# Patient Record
Sex: FEMALE | Race: WHITE | NOT HISPANIC OR LATINO | Employment: UNEMPLOYED | URBAN - METROPOLITAN AREA
[De-identification: names, ages, dates, MRNs, and addresses within clinical notes are randomized per-mention and may not be internally consistent; named-entity substitution may affect disease eponyms.]

---

## 2018-03-27 ENCOUNTER — OFFICE VISIT (OUTPATIENT)
Dept: MEDICAL GROUP | Facility: MEDICAL CENTER | Age: 28
End: 2018-03-27
Payer: COMMERCIAL

## 2018-03-27 VITALS
DIASTOLIC BLOOD PRESSURE: 80 MMHG | WEIGHT: 270.6 LBS | TEMPERATURE: 98.7 F | HEIGHT: 66 IN | OXYGEN SATURATION: 100 % | BODY MASS INDEX: 43.49 KG/M2 | SYSTOLIC BLOOD PRESSURE: 120 MMHG | HEART RATE: 91 BPM | RESPIRATION RATE: 16 BRPM

## 2018-03-27 DIAGNOSIS — N92.6 IRREGULAR MENSES: ICD-10-CM

## 2018-03-27 DIAGNOSIS — E66.01 MORBID OBESITY WITH BMI OF 40.0-44.9, ADULT (HCC): ICD-10-CM

## 2018-03-27 DIAGNOSIS — Z76.89 ENCOUNTER TO ESTABLISH CARE: ICD-10-CM

## 2018-03-27 DIAGNOSIS — N64.4 BREAST PAIN, RIGHT: ICD-10-CM

## 2018-03-27 DIAGNOSIS — D25.9 UTERINE LEIOMYOMA, UNSPECIFIED LOCATION: ICD-10-CM

## 2018-03-27 LAB
INT CON NEG: NEGATIVE
INT CON POS: POSITIVE
POC URINE PREGNANCY TEST: NEGATIVE

## 2018-03-27 PROCEDURE — 99204 OFFICE O/P NEW MOD 45 MIN: CPT | Performed by: PHYSICIAN ASSISTANT

## 2018-03-27 PROCEDURE — 81025 URINE PREGNANCY TEST: CPT | Performed by: PHYSICIAN ASSISTANT

## 2018-03-27 ASSESSMENT — PATIENT HEALTH QUESTIONNAIRE - PHQ9
5. POOR APPETITE OR OVEREATING: 0 - NOT AT ALL
CLINICAL INTERPRETATION OF PHQ2 SCORE: 1
SUM OF ALL RESPONSES TO PHQ QUESTIONS 1-9: 8

## 2018-03-27 NOTE — ASSESSMENT & PLAN NOTE
This is a 27-year-old female who is here today to establish care. She has a chronic history of irregular menses. States that she will not have a menses for a month after she is supposed to. This past time it was almost 2 months. She is just finishing her cycle today. In the past also she has had 14 days of vaginal bleeding. She had an ultrasound performed in 2016 that showed fibroids. She was told that they were small. She has one child. He is 7. It took them 3 years to conceive. She would like to consider possibly having kids in the near future. Makes it difficult when her menses are irregular. Denies any pelvic pain.

## 2018-03-27 NOTE — PROGRESS NOTES
"Subjective:   Kaitlynn Gregorio is a 27 y.o. female here today for chronic history of irregular menses and to establish care.    Irregular menses  This is a 27-year-old female who is here today to establish care. She has a chronic history of irregular menses. States that she will not have a menses for a month after she is supposed to. This past time it was almost 2 months. She is just finishing her cycle today. In the past also she has had 14 days of vaginal bleeding. She had an ultrasound performed in 2016 that showed fibroids. She was told that they were small. She has one child. He is 7. It took them 3 years to conceive. She would like to consider possibly having kids in the near future. Makes it difficult when her menses are irregular. Denies any pelvic pain.    Breast pain, right  Also complains of some right-sided breast pain over the past couple days. Initially symptoms were on the left side. Over the past days symptoms have improved. Denies any redness. Denies any significant pain. Denies any trauma. No family history of breast cancer. Mother  at 41 from Occasions with a grand mal seizure.       Current medicines (including changes today)  Current Outpatient Prescriptions   Medication Sig Dispense Refill   • RaNITidine HCl (ZANTAC PO) Take  by mouth.     • Acetaminophen (TYLENOL 8 HOUR PO) Take  by mouth.       No current facility-administered medications for this visit.      She  has no past medical history on file.    Social History and Family History were reviewed and updated.    ROS   No chest pain, no shortness of breath, no abdominal pain and all other systems were reviewed and are negative.       Objective:     Blood pressure 120/80, pulse 91, temperature 37.1 °C (98.7 °F), resp. rate 16, height 1.676 m (5' 6\"), weight 122.7 kg (270 lb 9.6 oz), last menstrual period 2018, SpO2 100 %, not currently breastfeeding. Body mass index is 43.68 kg/m².   Physical Exam:  Constitutional: Alert, no " distress.  Skin: Warm, dry, good turgor, no rashes in visible areas.  Eye: Equal, round and reactive, conjunctiva clear, lids normal.  ENMT: Lips without lesions, good dentition, oropharynx clear.  Neck: Trachea midline, no masses.   Lymph: No cervical or supraclavicular lymphadenopathy  Respiratory: Unlabored respiratory effort, lungs clear to auscultation, no wheezes, no ronchi.  Cardiovascular: Normal S1, S2, no murmur, no edema.  Breast exam was deferred.  Psych: Alert and oriented x3, normal affect and mood.        Assessment and Plan:   The following treatment plan was discussed    1. Irregular menses  Chronic condition. Referred to gynecology to establish care. She is not pregnant today. Discussed possible birth control but she is trying to get pregnant.  - POCT Pregnancy  - REFERRAL TO GYNECOLOGY    2. Uterine leiomyoma, unspecified location  Chronic condition. Likely not a cause of her regular menses. Referred to gynecology. Offered imaging in case she can't see gynecology for several months.  - POCT Pregnancy  - REFERRAL TO GYNECOLOGY    3. Breast pain, right  Acute, new onset condition. Resolving. Advised to contact me with any further concerns. Would order diagnostic and ultrasound.    4. Morbid obesity with BMI of 40.0-44.9, adult (CMS-HCC)  Chronic condition. Urged to stop soda use. We'll discuss in follow-up.  - Patient identified as having weight management issue.  Appropriate orders and counseling given.    5. Encounter to establish care      Followup: Return if symptoms worsen or fail to improve.    Please note that this dictation was created using voice recognition software. I have made every reasonable attempt to correct obvious errors, but I expect that there are errors of grammar and possibly content that I did not discover before finalizing the note.

## 2018-03-27 NOTE — ASSESSMENT & PLAN NOTE
Also complains of some right-sided breast pain over the past couple days. Initially symptoms were on the left side. Over the past days symptoms have improved. Denies any redness. Denies any significant pain. Denies any trauma. No family history of breast cancer. Mother  at 41 from Occasions with a grand mal seizure.

## 2018-04-26 ENCOUNTER — APPOINTMENT (OUTPATIENT)
Dept: MEDICAL GROUP | Facility: MEDICAL CENTER | Age: 28
End: 2018-04-26
Payer: COMMERCIAL

## 2018-06-26 ENCOUNTER — GYNECOLOGY VISIT (OUTPATIENT)
Dept: OBGYN | Facility: CLINIC | Age: 28
End: 2018-06-26
Payer: COMMERCIAL

## 2018-06-26 ENCOUNTER — HOSPITAL ENCOUNTER (OUTPATIENT)
Facility: MEDICAL CENTER | Age: 28
End: 2018-06-26
Attending: OBSTETRICS & GYNECOLOGY
Payer: COMMERCIAL

## 2018-06-26 VITALS
WEIGHT: 268 LBS | DIASTOLIC BLOOD PRESSURE: 80 MMHG | BODY MASS INDEX: 43.07 KG/M2 | HEIGHT: 66 IN | SYSTOLIC BLOOD PRESSURE: 122 MMHG

## 2018-06-26 DIAGNOSIS — Z11.51 SCREENING FOR HUMAN PAPILLOMAVIRUS (HPV): ICD-10-CM

## 2018-06-26 DIAGNOSIS — Z12.4 SCREENING FOR MALIGNANT NEOPLASM OF CERVIX: ICD-10-CM

## 2018-06-26 DIAGNOSIS — E66.01 MORBID OBESITY WITH BMI OF 40.0-44.9, ADULT (HCC): ICD-10-CM

## 2018-06-26 DIAGNOSIS — N92.6 IRREGULAR MENSTRUATION: ICD-10-CM

## 2018-06-26 DIAGNOSIS — Z01.419 WELL WOMAN EXAM: ICD-10-CM

## 2018-06-26 LAB
INT CON NEG: NEGATIVE
INT CON POS: POSITIVE
POC URINE PREGNANCY TEST: NEGATIVE

## 2018-06-26 PROCEDURE — 81025 URINE PREGNANCY TEST: CPT | Performed by: OBSTETRICS & GYNECOLOGY

## 2018-06-26 PROCEDURE — 88175 CYTOPATH C/V AUTO FLUID REDO: CPT

## 2018-06-26 PROCEDURE — 87591 N.GONORRHOEAE DNA AMP PROB: CPT

## 2018-06-26 PROCEDURE — 87491 CHLMYD TRACH DNA AMP PROBE: CPT

## 2018-06-26 PROCEDURE — 99203 OFFICE O/P NEW LOW 30 MIN: CPT | Performed by: OBSTETRICS & GYNECOLOGY

## 2018-06-26 RX ORDER — MEDROXYPROGESTERONE ACETATE 10 MG/1
10 TABLET ORAL DAILY
Qty: 30 TAB | Refills: 3 | Status: SHIPPED | OUTPATIENT
Start: 2018-06-26 | End: 2019-08-29

## 2018-06-26 NOTE — PROGRESS NOTES
Chief Complaint   Patient presents with   • New Patient       History of present illness:   27 y.o.  presents with chief complaint of irregular periods, trying to get pregnant, hx of fibroids. Onset: years ago; Location: pelvis; duration: varies; severity: moderate; time: years; aggravating factors: nothing, alleviating factors: nothing.  Pt referred by PCP. Heavy irregular periods since menarche. Irregular, occur every 1 to 3 months but usually every month. Always start at a different time. Last 7 days but up to 10. Heavy flow, uses super tampons and will change in 2-3 hours. Was told she had small fibroids by another doctor in [place] 2 years ago. Wants to get pregnant. No weight gain recently, has been current weight or slightly lower for years. States has always been overweight. Does not eat healthy or diet, does not exercise, admits to drinking soda daily.    ROS: Pertinent positives documented in HPI and all other systems reviewed & are negative    POBHx: hx 1 term , 1 blighted ovum tx by D&C    PGYNHx: no abnl pap, last pap a couple years ago and normal    All PMH, PSH, meds, allergies, social history and FH reviewed and updated today:  Past Medical History:   Diagnosis Date   • GERD (gastroesophageal reflux disease)    • Migraine    Obesity    Past Surgical History:   Procedure Laterality Date   • DILATION AND CURETTAGE             Allergies:   Allergies   Allergen Reactions   • Amoxicillin    • Penicillins        Social History     Social History   • Marital status:      Spouse name: N/A   • Number of children: N/A   • Years of education: N/A     Occupational History   • Not on file.     Social History Main Topics   • Smoking status: Never Smoker   • Smokeless tobacco: Never Used   • Alcohol use Yes      Comment: occasional   • Drug use: No   • Sexual activity: Yes     Partners: Male      Comment: one child     Other Topics Concern   • Not on file     Social History Narrative   • No  "narrative on file       Family History   Problem Relation Age of Onset   • Heart Disease Father    • Hypertension Father    • Hyperlipidemia Father    • Diabetes Father        Physical exam:  Blood pressure 122/80, height 1.676 m (5' 6\"), weight 121.6 kg (268 lb), last menstrual period 04/01/2018, not currently breastfeeding.    GENERAL APPEARANCE: alert, no distress, cooperative, obese  NECK nontender, no masses, thyromegaly or nodules  HEART RRR with normal S1 and S2 ,no murmurs, no gallops, no peripheral edema  LUNG clear to auscultation, normal respiratory effort  ABDOMEN Abdomen soft, obese, non-tender. BS normal. No masses, exam limited by obesity  FEMALE GYN: exam limited by patient's obesity, normal female external genitalia without lesions, BUS normal without lesions, vaginal mucosa pink and moist, clear vaginal discharge noted, cervix normal in appearance without lesions, no CMT, urethra is normal without discharge or scarring, no bladder fullness or masses, normal anus and perineum. Uterus mobile, normal size, and nontender. Ovaries normal size and nontender bilaterally. No palpable masses.  EXTREMITIES:negative clubbing, cyanosis, edema, No deformities, No skin discoloration    NEURO Awake, alert and oriented x 3, Normal gait, no sensory deficits  SKIN No rashes, or ulcers or lesions seen  PSYCHIATRIC: Patient shows appropriate affect, is alert and oriented x3, intact judgment and insight.      Assessment:  1. Irregular menstruation  US-GYN-PELVIS TRANSVAGINAL    POCT Pregnancy   2. Morbid obesity with BMI of 40.0-44.9, adult (HCC)  POCT Pregnancy   3. Well woman exam  THINPREP RFLX HPV ASCUS W/CTNG    POCT Pregnancy   4. Screening for malignant neoplasm of cervix  THINPREP RFLX HPV ASCUS W/CTNG   5. Screening for human papillomavirus (HPV)  THINPREP RFLX HPV ASCUS W/CTNG       Plan:  Pap smear collected  Transvaginal ultrasound  Weight loss encouraged  Healthy diet and daily exercise encouraged  Take " prenatal vitamin daily  Urine pregnancy test negative today  Suspect patient's irregular periods are due to her BMI of 47  Rx of Provera 10mg for 10 days when menstrual cycle is missed  Would not give medications to help with ovulation/pregnancy at her current BMI  Return to office yearly or sooner prn    Spent 30 minutes in face-to-face patient contact in which greater than 50% of that visit was spent in counseling/coordination of care including medical and surgical options of care.

## 2018-06-26 NOTE — PROGRESS NOTES
"Kaitlynn Gregorio is a 27 y.o.  female who presents for her Annual Gynecologic Exam      HPI Comments: Pt presents for her annual well woman exam.   Pt has complaint of abnormal periods, pain with periods, wants to get pregnant.  Patient's last menstrual period was 2018.    Review of Systems:   Pertinent positives documented in HPI and all other systems reviewed & are negative    PGYN Hx: unsure of last pap    All PMH, PSH, allergies, social history and FH reviewed and updated today:  PMH: morbid obesity, heartburn  History reviewed. No pertinent past medical history.    Past Surgical History:   Procedure Laterality Date   • DILATION AND CURETTAGE             Medications:   Current Outpatient Prescriptions [facility] in [facility]   Medication Sig Dispense Refill   • RaNITidine HCl (ZANTAC PO) Take  by mouth.     • Acetaminophen (TYLENOL 8 HOUR PO) Take  by mouth.       No current Epic-ordered facility-administered medications on file.        Amoxicillin and Penicillins    Social History     Social History   • Marital status:      Spouse name: N/A   • Number of children: N/A   • Years of education: N/A     Social History Main Topics   • Smoking status: Never Smoker   • Smokeless tobacco: Never Used   • Alcohol use Yes      Comment: occasional   • Drug use: No   • Sexual activity: Yes     Partners: Male      Comment: one child     Other Topics Concern   • Not on file     Social History Narrative   • No narrative on file       Family History   Problem Relation Age of Onset   • Heart Disease Father    • Hypertension Father    • Hyperlipidemia Father    • Diabetes Father           Objective:   Vital measurements:  Blood pressure 122/80, height 1.676 m (5' 6\"), weight 121.6 kg (268 lb), last menstrual period 2018, not currently breastfeeding.  Body mass index is 43.26 kg/m². (Goal BM I>18 <25)    Physical Exam   Nursing note and vitals reviewed.  Constitutional: She is oriented to person, place, and time. " She appears well-developed and well-nourished. No distress.     HEENT:   Head: Normocephalic and atraumatic.   Right Ear: External ear normal.   Left Ear: External ear normal.   Nose: Nose normal.   Eyes: Conjunctivae and EOM are normal. Pupils are equal, round, and reactive to light. No scleral icterus.     Neck: Normal range of motion. Neck supple. No tracheal deviation present. No thyromegaly present. No cervical or supraclavicular lymphadenopathy.    Pulmonary/Chest: Effort normal and breath sounds normal. No respiratory distress. She has no wheezes. She has no rales. She exhibits no tenderness.     Cardiovascular: Regular, rate and rhythm. No edema.    Breast: Symmetrical, normal consistency without masses., No dimpling or skin changes, Normal nipples without discharge, no axillary lymphadenopathy    Abdominal: Soft. Bowel sounds are normal. She exhibits no distension and no mass. No tenderness. She has no rebound and no guarding.     Genitourinary:  Exam limited by patient's obesity  Pelvic exam was performed with patient supine.  External genitalia with no abnormal pigmentation, labial fusion, rashes, tenderness, lesions or injury to the labia bilaterally.  BUS normal  Vagina is pink and moist with no lesions, foul discharge, erythema, tenderness or bleeding. No foreign body around the vagina or signs of injury.   Cervix exhibits no motion tenderness, no discharge and no friability, no lesions.   Uterus is not deviated, not enlarged, not fixed and not tender.  Right adnexa displays no mass, no tenderness and no fullness.  Left adnexa displays no mass, no tenderness and no fullness.     Musculoskeletal: Normal range of motion. Non tender. She exhibits no edema and no tenderness.     Lymphadenopathy: She has no cervical or supraclavicular adenopathy.     Neurological: She is alert and oriented to person, place, and time. She exhibits normal muscle tone.     Skin: Skin is warm and dry. No rash noted. She is not  diaphoretic. No erythema. No pallor.     Psychiatric: She has a normal mood and affect. Her behavior is normal. Judgment and thought content normal.        Assessment:     1. Well woman exam  THINPREP RFLX HPV ASCUS W/CTNG   2. Screening for malignant neoplasm of cervix  THINPREP RFLX HPV ASCUS W/CTNG   3. Screening for human papillomavirus (HPV)  THINPREP RFLX HPV ASCUS W/CTNG   4. Morbid obesity with BMI of 40.0-44.9, adult (HCC)     5. Irregular menstruation           Plan:   Pap and physical exam performed  Self breast awareness discussed.  Encouraged exercise and proper diet.  Mammograms starting @ age 40 annually.  See medications and orders placed in encounter report.  Follow up in 1 year for annual exam or sooner as needed

## 2018-06-29 LAB
C TRACH DNA GENITAL QL NAA+PROBE: NEGATIVE
CYTOLOGY REG CYTOL: NORMAL
N GONORRHOEA DNA GENITAL QL NAA+PROBE: NEGATIVE
SPECIMEN SOURCE: NORMAL

## 2018-07-05 ENCOUNTER — HOSPITAL ENCOUNTER (OUTPATIENT)
Dept: RADIOLOGY | Facility: MEDICAL CENTER | Age: 28
End: 2018-07-05
Attending: OBSTETRICS & GYNECOLOGY
Payer: COMMERCIAL

## 2018-07-05 DIAGNOSIS — N92.6 IRREGULAR MENSTRUATION: ICD-10-CM

## 2018-07-05 PROCEDURE — 76830 TRANSVAGINAL US NON-OB: CPT

## 2018-07-17 ENCOUNTER — TELEPHONE (OUTPATIENT)
Dept: OBGYN | Facility: MEDICAL CENTER | Age: 28
End: 2018-07-17

## 2018-07-17 NOTE — TELEPHONE ENCOUNTER
----- Message from Karen Tirado M.D. sent at 7/13/2018  4:03 PM PDT -----  Please inform the patient that her pelvic ultrasound was normal, she does NOT have any fibroids seen, she does have small cysts in her cervix which are called Nabothian cysts and those are NORMAL

## 2018-07-17 NOTE — TELEPHONE ENCOUNTER
Called to notify patient of u/s results. Pt verbalized understanding and had no further questions.

## 2018-08-02 RX ORDER — MEDROXYPROGESTERONE ACETATE 10 MG/1
20 TABLET ORAL 2 TIMES DAILY
Qty: 20 TAB | Refills: 0 | Status: SHIPPED | OUTPATIENT
Start: 2018-08-02 | End: 2019-08-29

## 2019-08-29 ENCOUNTER — OFFICE VISIT (OUTPATIENT)
Dept: MEDICAL GROUP | Facility: MEDICAL CENTER | Age: 29
End: 2019-08-29
Payer: COMMERCIAL

## 2019-08-29 VITALS
WEIGHT: 264.55 LBS | SYSTOLIC BLOOD PRESSURE: 122 MMHG | OXYGEN SATURATION: 95 % | TEMPERATURE: 97.9 F | HEIGHT: 67 IN | HEART RATE: 96 BPM | DIASTOLIC BLOOD PRESSURE: 68 MMHG | BODY MASS INDEX: 41.52 KG/M2 | RESPIRATION RATE: 16 BRPM

## 2019-08-29 DIAGNOSIS — G89.29 CHRONIC PAIN OF BOTH ANKLES: ICD-10-CM

## 2019-08-29 DIAGNOSIS — M25.571 CHRONIC PAIN OF BOTH ANKLES: ICD-10-CM

## 2019-08-29 DIAGNOSIS — H10.31 ACUTE CONJUNCTIVITIS OF RIGHT EYE, UNSPECIFIED ACUTE CONJUNCTIVITIS TYPE: ICD-10-CM

## 2019-08-29 DIAGNOSIS — M25.572 CHRONIC PAIN OF BOTH ANKLES: ICD-10-CM

## 2019-08-29 DIAGNOSIS — N92.6 IRREGULAR MENSES: ICD-10-CM

## 2019-08-29 PROBLEM — N64.4 BREAST PAIN, RIGHT: Status: RESOLVED | Noted: 2018-03-27 | Resolved: 2019-08-29

## 2019-08-29 PROBLEM — D25.9 FIBROID, UTERINE: Status: RESOLVED | Noted: 2018-03-27 | Resolved: 2019-08-29

## 2019-08-29 PROCEDURE — 99214 OFFICE O/P EST MOD 30 MIN: CPT | Performed by: PHYSICIAN ASSISTANT

## 2019-08-29 RX ORDER — OFLOXACIN 3 MG/ML
1 SOLUTION/ DROPS OPHTHALMIC 4 TIMES DAILY
Qty: 1 BOTTLE | Refills: 0 | Status: SHIPPED | OUTPATIENT
Start: 2019-08-29 | End: 2020-08-21

## 2019-08-29 ASSESSMENT — PATIENT HEALTH QUESTIONNAIRE - PHQ9: CLINICAL INTERPRETATION OF PHQ2 SCORE: 0

## 2019-08-29 NOTE — ASSESSMENT & PLAN NOTE
This is a 29-year-old female accompanied by her  and son.  Complains of chronic pain in both of her ankles.  Years ago she sprained her right ankle.  In June she developed left ankle pain.  There is a sharp pain at times when she stepped down.  States is in the front part of the ankle.  She wears sandals.  Is been wearing the sandals from [employer] all the time.  Wears them in the winter as well as in the summertime.  Pain does not radiate.  No recent injury.  Does not take any medications such as nonsteroidals.

## 2019-08-29 NOTE — ASSESSMENT & PLAN NOTE
Chronic condition.  Follows with gynecology.  No improvement on birth control.  Stop the birth control because of side effects.  Is looking to follow-up with endocrinologist.  Is waiting for medical records to be sent over to endocrinology.

## 2019-08-29 NOTE — PROGRESS NOTES
Subjective:   Kaitlynn Gregorio is a 29 y.o. female here today for left ankle pain since June but a chronic history of right ankle pain, discharge of the right eye since this morning and history of regular menses.    Chronic pain of both ankles  This is a 29-year-old female accompanied by her  and son.  Complains of chronic pain in both of her ankles.  Years ago she sprained her right ankle.  In June she developed left ankle pain.  There is a sharp pain at times when she stepped down.  States is in the front part of the ankle.  She wears sandals.  Is been wearing the sandals from [employer] all the time.  Wears them in the winter as well as in the summertime.  Pain does not radiate.  No recent injury.  Does not take any medications such as nonsteroidals.    Acute conjunctivitis of right eye  Woke up this morning with some irritation and itchiness of her eyes.  A white discharge as well.  Is wondering if she has a conjunctivitis.  She does have allergies.  Of the been worse recently.    Irregular menses  Chronic condition.  Follows with gynecology.  No improvement on birth control.  Stop the birth control because of side effects.  Is looking to follow-up with endocrinologist.  Is waiting for medical records to be sent over to endocrinology.       Current medicines (including changes today)  Current Outpatient Medications   Medication Sig Dispense Refill   • ofloxacin (OCUFLOX) 0.3 % Solution Place 1 Drop in right eye 4 times a day. 1 Bottle 0   • RaNITidine HCl (ZANTAC PO) Take  by mouth.       No current facility-administered medications for this visit.      She  has a past medical history of GERD (gastroesophageal reflux disease) and Migraine.    Social History and Family History were reviewed and updated.    ROS   No chest pain, no shortness of breath, no abdominal pain and all other systems were reviewed and are negative.       Objective:     /68 (BP Location: Left arm, Patient Position: Sitting, BP Cuff Size:  "Adult)   Pulse 96   Temp 36.6 °C (97.9 °F) (Temporal)   Resp 16   Ht 1.702 m (5' 7\")   Wt 120 kg (264 lb 8.8 oz)   SpO2 95%  Body mass index is 41.43 kg/m².   Physical Exam:  Constitutional: Alert, no distress.  Skin: Warm, dry, good turgor, no rashes in visible areas.  Eye: Equal, round and reactive, conjunctiva right appears slightly erythematous, lids normal.  ENMT: Lips without lesions, good dentition, oropharynx clear.  Neck: Trachea midline, no masses.   Lymph: No cervical or supraclavicular lymphadenopathy  Respiratory: Unlabored respiratory effort, lungs clear to auscultation, no wheezes, no ronchi.  Cardiovascular: Normal S1, S2, no murmur, no edema.  Muscular skeletal: Bilateral ankles appear symmetrical.  Range of motion on the right is good.  No tenderness surrounding the ankle.  Psych: Alert and oriented x3, normal affect and mood.        Assessment and Plan:   The following treatment plan was discussed    1. Chronic pain of both ankles  Acute, new onset condition.  Right side with chronic concern.  Likely overuse.  Advised to wear shoes with better support.  Referred to PT for evaluation and treatment.  Likely strain.  - REFERRAL TO PHYSICAL THERAPY Reason for Therapy: Eval/Treat/Report    2. Acute conjunctivitis of right eye, unspecified acute conjunctivitis type  Acute, new onset condition.  Advised if she continues to wake up with discharge which becomes thick and colored she is to start the antibiotic.  Prescribed ofloxacin.  If the eye continues to be itchy with a white discharge would start Zaditor.  May purchase that over-the-counter.  - ofloxacin (OCUFLOX) 0.3 % Solution; Place 1 Drop in right eye 4 times a day.  Dispense: 1 Bottle; Refill: 0    3. Irregular menses  Chronic condition.  Stable.  Would follow-up with gynecology.  Follow-up with endocrinology.      Followup: Return if symptoms worsen or fail to improve.    Please note that this dictation was created using voice recognition " software. I have made every reasonable attempt to correct obvious errors, but I expect that there are errors of grammar and possibly content that I did not discover before finalizing the note.

## 2019-08-29 NOTE — ASSESSMENT & PLAN NOTE
Woke up this morning with some irritation and itchiness of her eyes.  A white discharge as well.  Is wondering if she has a conjunctivitis.  She does have allergies.  Of the been worse recently.

## 2019-11-22 ENCOUNTER — OFFICE VISIT (OUTPATIENT)
Dept: URGENT CARE | Facility: PHYSICIAN GROUP | Age: 29
End: 2019-11-22
Payer: COMMERCIAL

## 2019-11-22 VITALS
TEMPERATURE: 97.4 F | DIASTOLIC BLOOD PRESSURE: 82 MMHG | WEIGHT: 265.4 LBS | SYSTOLIC BLOOD PRESSURE: 120 MMHG | BODY MASS INDEX: 41.65 KG/M2 | RESPIRATION RATE: 16 BRPM | HEIGHT: 67 IN | OXYGEN SATURATION: 94 % | HEART RATE: 103 BPM

## 2019-11-22 DIAGNOSIS — J03.90 EXUDATIVE TONSILLITIS: Primary | ICD-10-CM

## 2019-11-22 DIAGNOSIS — J02.9 SORE THROAT: ICD-10-CM

## 2019-11-22 DIAGNOSIS — Z20.818 STREP THROAT EXPOSURE: ICD-10-CM

## 2019-11-22 LAB
INT CON NEG: NORMAL
INT CON POS: NORMAL
S PYO AG THROAT QL: NEGATIVE

## 2019-11-22 PROCEDURE — 87880 STREP A ASSAY W/OPTIC: CPT | Performed by: PHYSICIAN ASSISTANT

## 2019-11-22 PROCEDURE — 99214 OFFICE O/P EST MOD 30 MIN: CPT | Performed by: PHYSICIAN ASSISTANT

## 2019-11-22 RX ORDER — AZITHROMYCIN 500 MG/1
500 TABLET, FILM COATED ORAL DAILY
Qty: 5 TAB | Refills: 0 | Status: SHIPPED | OUTPATIENT
Start: 2019-11-22 | End: 2019-11-27

## 2019-11-22 NOTE — PROGRESS NOTES
Subjective:      Kaitlynn Gregorio is a 29 y.o. female who presents with Sore Throat (white spots in the back of throat, started this morning )    PMH:  has a past medical history of GERD (gastroesophageal reflux disease) and Migraine.  MEDS:   Current Outpatient Medications:   •  azithromycin (ZITHROMAX) 500 MG tablet, Take 1 Tab by mouth every day for 5 days., Disp: 5 Tab, Rfl: 0  •  RaNITidine HCl (ZANTAC PO), Take  by mouth., Disp: , Rfl:   •  ofloxacin (OCUFLOX) 0.3 % Solution, Place 1 Drop in right eye 4 times a day. (Patient not taking: Reported on 11/22/2019), Disp: 1 Bottle, Rfl: 0  ALLERGIES:   Allergies   Allergen Reactions   • Amoxicillin    • Penicillins      SURGHX:   Past Surgical History:   Procedure Laterality Date   • DILATION AND CURETTAGE      2014     SOCHX:  reports that she has never smoked. She has never used smokeless tobacco. She reports current alcohol use. She reports that she does not use drugs.  FH: Reviewed with patient, not pertinent to this visit.           Patient presents with:  Sore Throat: white spots in the back of throat, started this morning. Both kids have strep throat.  Feels she has now gotten it too.  Denies cough, congestion or any other complaint.         Pharyngitis    This is a new problem. The current episode started today. The problem has been rapidly worsening. Neither side of throat is experiencing more pain than the other. There has been no fever. The pain is at a severity of 8/10. Associated symptoms include headaches, swollen glands and trouble swallowing. Pertinent negatives include no congestion, coughing or stridor. She has had exposure to strep. She has tried cool liquids and NSAIDs for the symptoms. The treatment provided no relief.       Review of Systems   Constitutional: Negative for fever.   HENT: Positive for sore throat and trouble swallowing. Negative for congestion.    Respiratory: Negative for cough and stridor.    Skin: Negative for rash.   Neurological:  "Positive for headaches.   All other systems reviewed and are negative.         Objective:     /82 (BP Location: Right arm, Patient Position: Sitting, BP Cuff Size: Large adult)   Pulse (!) 103   Temp 36.3 °C (97.4 °F) (Temporal)   Resp 16   Ht 1.702 m (5' 7\")   Wt 120.4 kg (265 lb 6.4 oz)   SpO2 94%   BMI 41.57 kg/m²      Physical Exam  Vitals signs and nursing note reviewed.   Constitutional:       General: She is not in acute distress.     Appearance: Normal appearance. She is well-developed. She is not toxic-appearing.   HENT:      Head: Normocephalic and atraumatic.      Right Ear: Tympanic membrane normal.      Left Ear: Tympanic membrane normal.      Nose: Nose normal.      Mouth/Throat:      Lips: Pink.      Mouth: Mucous membranes are moist.      Pharynx: Uvula midline. Posterior oropharyngeal erythema present. No uvula swelling.      Tonsils: Tonsillar exudate present. Swellin+ on the right. 2+ on the left.   Eyes:      Extraocular Movements: Extraocular movements intact.      Conjunctiva/sclera: Conjunctivae normal.      Pupils: Pupils are equal, round, and reactive to light.   Neck:      Musculoskeletal: Normal range of motion and neck supple.   Cardiovascular:      Rate and Rhythm: Normal rate and regular rhythm.      Heart sounds: Normal heart sounds.   Pulmonary:      Effort: Pulmonary effort is normal.      Breath sounds: Normal breath sounds.   Abdominal:      Palpations: Abdomen is soft.   Musculoskeletal: Normal range of motion.   Lymphadenopathy:      Cervical: Cervical adenopathy present.   Skin:     General: Skin is warm and dry.      Capillary Refill: Capillary refill takes less than 2 seconds.   Neurological:      General: No focal deficit present.      Mental Status: She is alert and oriented to person, place, and time.      Gait: Gait normal.   Psychiatric:         Mood and Affect: Mood normal.         Behavior: Behavior is cooperative.            Strep: neg   "   Assessment/Plan:     1. Exudative tonsillitis  azithromycin (ZITHROMAX) 500 MG tablet   2. Sore throat  POCT Rapid Strep A    azithromycin (ZITHROMAX) 500 MG tablet   3. Strep throat exposure  azithromycin (ZITHROMAX) 500 MG tablet     Pt has 2 + strep exposures at home, + swelling and exudates on tonsils, no cough, will treat with abx for strep throat.     Motrin/Advil/Ibuprophen 600 mg every 6 hours as needed for pain or fever.    PT advised saltwater gargles/swishes  3-4 times daily until symptoms improve.     PT should follow up with PCP in 1-2 days for re-evaluation if symptoms have not improved.  Discussed red flags and reasons to return to UC or ED.  Pt and/or family verbalized understanding of diagnosis and follow up instructions and was offered informational handout on diagnosis.  PT discharged.

## 2019-11-27 ASSESSMENT — ENCOUNTER SYMPTOMS
SWOLLEN GLANDS: 1
TROUBLE SWALLOWING: 1
COUGH: 0
FEVER: 0
HEADACHES: 1
SORE THROAT: 1
STRIDOR: 0

## 2020-08-21 ENCOUNTER — OFFICE VISIT (OUTPATIENT)
Dept: MEDICAL GROUP | Facility: MEDICAL CENTER | Age: 30
End: 2020-08-21
Payer: COMMERCIAL

## 2020-08-21 ENCOUNTER — HOSPITAL ENCOUNTER (OUTPATIENT)
Dept: LAB | Facility: MEDICAL CENTER | Age: 30
End: 2020-08-21
Attending: PHYSICIAN ASSISTANT
Payer: COMMERCIAL

## 2020-08-21 VITALS
BODY MASS INDEX: 40.18 KG/M2 | HEIGHT: 67 IN | SYSTOLIC BLOOD PRESSURE: 124 MMHG | TEMPERATURE: 98.2 F | RESPIRATION RATE: 16 BRPM | OXYGEN SATURATION: 96 % | DIASTOLIC BLOOD PRESSURE: 72 MMHG | HEART RATE: 98 BPM | WEIGHT: 256 LBS

## 2020-08-21 DIAGNOSIS — Z00.00 PREVENTATIVE HEALTH CARE: ICD-10-CM

## 2020-08-21 DIAGNOSIS — S93.402A SPRAIN OF LEFT ANKLE, UNSPECIFIED LIGAMENT, INITIAL ENCOUNTER: ICD-10-CM

## 2020-08-21 DIAGNOSIS — E66.01 MORBID OBESITY WITH BMI OF 40.0-44.9, ADULT (HCC): ICD-10-CM

## 2020-08-21 PROBLEM — G89.29 CHRONIC PAIN OF BOTH ANKLES: Status: RESOLVED | Noted: 2019-08-29 | Resolved: 2020-08-21

## 2020-08-21 PROBLEM — H10.31 ACUTE CONJUNCTIVITIS OF RIGHT EYE: Status: RESOLVED | Noted: 2019-08-29 | Resolved: 2020-08-21

## 2020-08-21 PROBLEM — M25.571 CHRONIC PAIN OF BOTH ANKLES: Status: RESOLVED | Noted: 2019-08-29 | Resolved: 2020-08-21

## 2020-08-21 PROBLEM — M25.572 CHRONIC PAIN OF BOTH ANKLES: Status: RESOLVED | Noted: 2019-08-29 | Resolved: 2020-08-21

## 2020-08-21 LAB
ALBUMIN SERPL BCP-MCNC: 4.1 G/DL (ref 3.2–4.9)
ALBUMIN/GLOB SERPL: 1.3 G/DL
ALP SERPL-CCNC: 97 U/L (ref 30–99)
ALT SERPL-CCNC: 53 U/L (ref 2–50)
ANION GAP SERPL CALC-SCNC: 16 MMOL/L (ref 7–16)
AST SERPL-CCNC: 28 U/L (ref 12–45)
BILIRUB SERPL-MCNC: 0.3 MG/DL (ref 0.1–1.5)
BUN SERPL-MCNC: 10 MG/DL (ref 8–22)
CALCIUM SERPL-MCNC: 9.3 MG/DL (ref 8.4–10.2)
CHLORIDE SERPL-SCNC: 103 MMOL/L (ref 96–112)
CHOLEST SERPL-MCNC: 200 MG/DL (ref 100–199)
CO2 SERPL-SCNC: 22 MMOL/L (ref 20–33)
CREAT SERPL-MCNC: 0.65 MG/DL (ref 0.5–1.4)
EST. AVERAGE GLUCOSE BLD GHB EST-MCNC: 97 MG/DL
FASTING STATUS PATIENT QL REPORTED: NORMAL
GLOBULIN SER CALC-MCNC: 3.1 G/DL (ref 1.9–3.5)
GLUCOSE SERPL-MCNC: 96 MG/DL (ref 65–99)
HBA1C MFR BLD: 5 % (ref 0–5.6)
HDLC SERPL-MCNC: 43 MG/DL
LDLC SERPL CALC-MCNC: 111 MG/DL
POTASSIUM SERPL-SCNC: 4.2 MMOL/L (ref 3.6–5.5)
PROT SERPL-MCNC: 7.2 G/DL (ref 6–8.2)
SODIUM SERPL-SCNC: 141 MMOL/L (ref 135–145)
TRIGL SERPL-MCNC: 230 MG/DL (ref 0–149)
TSH SERPL DL<=0.005 MIU/L-ACNC: 1.7 UIU/ML (ref 0.38–5.33)

## 2020-08-21 PROCEDURE — 80053 COMPREHEN METABOLIC PANEL: CPT

## 2020-08-21 PROCEDURE — 80061 LIPID PANEL: CPT

## 2020-08-21 PROCEDURE — 83036 HEMOGLOBIN GLYCOSYLATED A1C: CPT

## 2020-08-21 PROCEDURE — 36415 COLL VENOUS BLD VENIPUNCTURE: CPT

## 2020-08-21 PROCEDURE — 99214 OFFICE O/P EST MOD 30 MIN: CPT | Performed by: PHYSICIAN ASSISTANT

## 2020-08-21 PROCEDURE — 84443 ASSAY THYROID STIM HORMONE: CPT

## 2020-08-21 RX ORDER — OMEPRAZOLE 20 MG/1
20 CAPSULE, DELAYED RELEASE ORAL DAILY
COMMUNITY

## 2020-08-21 ASSESSMENT — PATIENT HEALTH QUESTIONNAIRE - PHQ9: CLINICAL INTERPRETATION OF PHQ2 SCORE: 0

## 2020-08-21 NOTE — ASSESSMENT & PLAN NOTE
This is a 30-year-old female comes in today with her .  Complains of an approximate 10-day history of left ankle pain.  Rolled the ankle laterally leaving her father-in-law's home.  Was seen initially in urgent care and x-ray was done.  She states that the x-ray showed no fracture.  She was diagnosed with a severe sprain.  Was given a brace.  Told to take ibuprofen.  Ice.  Elevation.  She states that the ankle stabilizer started give her blisters so she purchased a wrap over-the-counter.  She complains of continued pain.  Continued swelling and bruising.  She has not been able to step on it without having significant pain and limited mobility.  She would like a second opinion as to whether or not she has a fracture.

## 2020-08-21 NOTE — PROGRESS NOTES
"Subjective:   Kaitlynn Gregorio is a 30 y.o. female here today for left ankle sprain for 10 days and preventative health care.    Left ankle sprain  This is a 30-year-old female comes in today with her .  Complains of an approximate 10-day history of left ankle pain.  Rolled the ankle laterally leaving her father-in-law's home.  Was seen initially in urgent care and x-ray was done.  She states that the x-ray showed no fracture.  She was diagnosed with a severe sprain.  Was given a brace.  Told to take ibuprofen.  Ice.  Elevation.  She states that the ankle stabilizer started give her blisters so she purchased a wrap over-the-counter.  She complains of continued pain.  Continued swelling and bruising.  She has not been able to step on it without having significant pain and limited mobility.  She would like a second opinion as to whether or not she has a fracture.       Current medicines (including changes today)  Current Outpatient Medications   Medication Sig Dispense Refill   • omeprazole (PRILOSEC) 20 MG delayed-release capsule Take 20 mg by mouth every day.     • RaNITidine HCl (ZANTAC PO) Take  by mouth.       No current facility-administered medications for this visit.      She  has a past medical history of GERD (gastroesophageal reflux disease) and Migraine.    Social History and Family History were reviewed and updated.    ROS   No chest pain, no shortness of breath, no abdominal pain and all other systems were reviewed and are negative.       Objective:     /72   Pulse 98   Temp 36.8 °C (98.2 °F) (Temporal)   Resp 16   Ht 1.702 m (5' 7\")   Wt 116.1 kg (256 lb)   SpO2 96%  Body mass index is 40.1 kg/m².   Physical Exam:  Constitutional: Alert, no distress.  Skin: Warm, dry, good turgor, no rashes in visible areas.  Eye: Equal, round and reactive, conjunctiva clear, lids normal.  ENMT: Lips without lesions, good dentition, oropharynx clear.  Neck: Trachea midline, no masses.   Lymph: No cervical " or supraclavicular lymphadenopathy  Respiratory: Unlabored respiratory effort, lungs clear to auscultation, no wheezes, no ronchi.  Cardiovascular: Regular rate and rhythm, no edema.  Musculoskeletal: Left ankle with slight swelling compared to the right side.  There is a darker, yellowish hue to the left side.  No tenderness of the lateral malleolus.  Range of motion is limited.  Psych: Alert and oriented x3, normal affect and mood.        Assessment and Plan:   The following treatment plan was discussed    1. Sprain of left ankle, unspecified ligament, initial encounter  Acute, new onset condition.  Does appear to be a sprain.  I am though concerned that she is unable to walk at this time.  Discussed with her about following up at [facility] for imaging and a second opinion.  Her ankle does not exhibit any findings that may be suggestive of needing surgical intervention.  We will continue to keep elevated and take ibuprofen as directed.  I did refer to orthopedics at the [facility] in case she does not see the [facility].  - REFERRAL TO ORTHOPEDICS    2. Preventative health care  Order labs fasting.  She will be contacted with the results.  She is currently fasting.  She will go to the labs right now.  - Lipid Profile; Future  - Comp Metabolic Panel; Future  - HEMOGLOBIN A1C; Future  - TSH WITH REFLEX TO FT4; Future      Followup: Return if symptoms worsen or fail to improve.    Please note that this dictation was created using voice recognition software. I have made every reasonable attempt to correct obvious errors, but I expect that there are errors of grammar and possibly content that I did not discover before finalizing the note.

## 2021-12-09 ENCOUNTER — OFFICE VISIT (OUTPATIENT)
Dept: URGENT CARE | Facility: PHYSICIAN GROUP | Age: 31
End: 2021-12-09
Payer: COMMERCIAL

## 2021-12-09 ENCOUNTER — HOSPITAL ENCOUNTER (OUTPATIENT)
Facility: MEDICAL CENTER | Age: 31
End: 2021-12-09
Attending: NURSE PRACTITIONER
Payer: COMMERCIAL

## 2021-12-09 VITALS
TEMPERATURE: 97.9 F | WEIGHT: 260 LBS | HEART RATE: 91 BPM | BODY MASS INDEX: 40.81 KG/M2 | HEIGHT: 67 IN | RESPIRATION RATE: 16 BRPM | OXYGEN SATURATION: 97 %

## 2021-12-09 DIAGNOSIS — H92.09 OTALGIA, UNSPECIFIED LATERALITY: ICD-10-CM

## 2021-12-09 DIAGNOSIS — J02.9 SORE THROAT: ICD-10-CM

## 2021-12-09 DIAGNOSIS — J34.89 NASAL CONGESTION WITH RHINORRHEA: ICD-10-CM

## 2021-12-09 DIAGNOSIS — J06.9 VIRAL URI: ICD-10-CM

## 2021-12-09 DIAGNOSIS — R09.81 NASAL CONGESTION WITH RHINORRHEA: ICD-10-CM

## 2021-12-09 LAB
INT CON NEG: NORMAL
INT CON POS: NORMAL
S PYO AG THROAT QL: NEGATIVE

## 2021-12-09 PROCEDURE — 87880 STREP A ASSAY W/OPTIC: CPT | Performed by: NURSE PRACTITIONER

## 2021-12-09 PROCEDURE — U0003 INFECTIOUS AGENT DETECTION BY NUCLEIC ACID (DNA OR RNA); SEVERE ACUTE RESPIRATORY SYNDROME CORONAVIRUS 2 (SARS-COV-2) (CORONAVIRUS DISEASE [COVID-19]), AMPLIFIED PROBE TECHNIQUE, MAKING USE OF HIGH THROUGHPUT TECHNOLOGIES AS DESCRIBED BY CMS-2020-01-R: HCPCS

## 2021-12-09 PROCEDURE — U0005 INFEC AGEN DETEC AMPLI PROBE: HCPCS

## 2021-12-09 PROCEDURE — 99213 OFFICE O/P EST LOW 20 MIN: CPT | Performed by: NURSE PRACTITIONER

## 2021-12-09 ASSESSMENT — ENCOUNTER SYMPTOMS
FEVER: 0
EYE DISCHARGE: 0
CONSTIPATION: 0
CHILLS: 0
COUGH: 0
EYE REDNESS: 0
NECK PAIN: 0
NAUSEA: 0
SORE THROAT: 1
WHEEZING: 0
WEAKNESS: 0
SHORTNESS OF BREATH: 0
VOMITING: 0
DIARRHEA: 0
DIZZINESS: 0
ABDOMINAL PAIN: 0
HEADACHES: 0
MYALGIAS: 0

## 2021-12-09 NOTE — PROGRESS NOTES
"Subjective     Kaitlynn Gregorio is a 31 y.o. female who presents with Sore Throat (x2days stuffy nose, swollen throat and hard to swallow, right ear pain )            HPI  States mild sore throat, hard to swallow, right ear pain x 2 days. Son exposed to COVID, being tested for this today. Dayquil. Allergy med. States unable to use nasal sprays due to epistaxis, has not tried saline nasal spray. No salt water gargle. States prone to Strep throat.     PMH:  has a past medical history of GERD (gastroesophageal reflux disease) and Migraine.  MEDS:   Current Outpatient Medications:   •  omeprazole (PRILOSEC) 20 MG delayed-release capsule, Take 20 mg by mouth every day. (Patient not taking: Reported on 12/9/2021), Disp: , Rfl:   •  RaNITidine HCl (ZANTAC PO), Take  by mouth. (Patient not taking: Reported on 12/9/2021), Disp: , Rfl:   ALLERGIES:   Allergies   Allergen Reactions   • Amoxicillin    • Penicillins      SURGHX:   Past Surgical History:   Procedure Laterality Date   • DILATION AND CURETTAGE      2014     SOCHX:  reports that she has never smoked. She has never used smokeless tobacco. She reports current alcohol use. She reports that she does not use drugs.  FH: Family history was reviewed, no pertinent findings to report    Review of Systems   Constitutional: Negative for chills, fever and malaise/fatigue.   HENT: Positive for congestion, ear pain and sore throat.    Eyes: Negative for discharge and redness.   Respiratory: Negative for cough, shortness of breath and wheezing.    Gastrointestinal: Negative for abdominal pain, constipation, diarrhea, nausea and vomiting.   Musculoskeletal: Negative for myalgias and neck pain.   Skin: Negative for itching and rash.   Neurological: Negative for dizziness, weakness and headaches.   Endo/Heme/Allergies: Positive for environmental allergies.   All other systems reviewed and are negative.             Objective     Ht 1.702 m (5' 7\")   Wt 118 kg (260 lb)   BMI 40.72 kg/m²  " "  Vitals:    12/09/21 1151   Pulse: 91   Resp: 16   Temp: 36.6 °C (97.9 °F)   TempSrc: Temporal   SpO2: 97%   Weight: 118 kg (260 lb)   Height: 1.702 m (5' 7\")       Physical Exam  Vitals reviewed.   Constitutional:       General: She is awake. She is not in acute distress.     Appearance: Normal appearance. She is well-developed. She is not ill-appearing, toxic-appearing or diaphoretic.   HENT:      Head: Normocephalic.      Right Ear: Ear canal and external ear normal. A middle ear effusion is present.      Left Ear: Ear canal and external ear normal. A middle ear effusion is present.      Nose: Congestion and rhinorrhea present.      Mouth/Throat:      Lips: Pink.      Mouth: Mucous membranes are moist.      Pharynx: Uvula midline. Pharyngeal swelling and posterior oropharyngeal erythema present. No oropharyngeal exudate or uvula swelling.      Tonsils: No tonsillar exudate or tonsillar abscesses. 1+ on the right. 1+ on the left.   Eyes:      Conjunctiva/sclera: Conjunctivae normal.      Pupils: Pupils are equal, round, and reactive to light.   Cardiovascular:      Rate and Rhythm: Normal rate.   Pulmonary:      Effort: Pulmonary effort is normal.      Breath sounds: Normal breath sounds and air entry. No stridor, decreased air movement or transmitted upper airway sounds. No decreased breath sounds, wheezing or rhonchi.   Musculoskeletal:         General: Normal range of motion.      Cervical back: Normal range of motion and neck supple.   Skin:     General: Skin is warm and dry.   Neurological:      Mental Status: She is alert and oriented to person, place, and time.   Psychiatric:         Attention and Perception: Attention normal.         Mood and Affect: Mood normal.         Speech: Speech normal.         Behavior: Behavior normal. Behavior is cooperative.                             Assessment & Plan                  1. Sore throat    - POCT Rapid Strep A: NEG  - SARS-CoV-2 PCR (24 hour In-House): Collect NP " swab in VTM; Future    2. Otalgia, unspecified laterality    - SARS-CoV-2 PCR (24 hour In-House): Collect NP swab in VTM; Future    3. Nasal congestion with rhinorrhea    - SARS-CoV-2 PCR (24 hour In-House): Collect NP swab in VTM; Future    4. Viral URI      -Maintain hydration/water intake  -May use over the counter Ibuprofen/Tylenol as needed for any fever, body aches or throat pain  -May take long acting antihistamine for seasonal allergy symptoms as needed  -May use over the counter saline nasal spray for nasal congestion as needed  -May use over the counter Nasacort/Flonase for nasal congestion as needed   -May use throat lozenges for throat discomfort as needed   -May gargle with salt water up to 4x/day as needed for throat discomfort (1 tsp salt dissolved in 1 cup warm water)  -Monitor for any sinus pain/pressure with sinus congestion with thick mucus production, sinus headache, cough, shortness of breath, fever- need re-evaluation

## 2021-12-10 LAB
COVID ORDER STATUS COVID19: NORMAL
SARS-COV-2 RNA RESP QL NAA+PROBE: DETECTED
SPECIMEN SOURCE: ABNORMAL

## 2021-12-21 ENCOUNTER — APPOINTMENT (OUTPATIENT)
Dept: RADIOLOGY | Facility: MEDICAL CENTER | Age: 31
End: 2021-12-21
Attending: STUDENT IN AN ORGANIZED HEALTH CARE EDUCATION/TRAINING PROGRAM
Payer: COMMERCIAL

## 2021-12-21 ENCOUNTER — HOSPITAL ENCOUNTER (EMERGENCY)
Facility: MEDICAL CENTER | Age: 31
End: 2021-12-21
Attending: STUDENT IN AN ORGANIZED HEALTH CARE EDUCATION/TRAINING PROGRAM
Payer: COMMERCIAL

## 2021-12-21 VITALS
WEIGHT: 260 LBS | HEART RATE: 87 BPM | SYSTOLIC BLOOD PRESSURE: 113 MMHG | HEIGHT: 67 IN | DIASTOLIC BLOOD PRESSURE: 66 MMHG | BODY MASS INDEX: 40.81 KG/M2 | TEMPERATURE: 98.6 F | RESPIRATION RATE: 19 BRPM | OXYGEN SATURATION: 98 %

## 2021-12-21 DIAGNOSIS — S39.012A STRAIN OF LUMBAR REGION, INITIAL ENCOUNTER: Primary | ICD-10-CM

## 2021-12-21 LAB — HCG SERPL QL: NEGATIVE

## 2021-12-21 PROCEDURE — 72100 X-RAY EXAM L-S SPINE 2/3 VWS: CPT

## 2021-12-21 PROCEDURE — 700101 HCHG RX REV CODE 250: Performed by: STUDENT IN AN ORGANIZED HEALTH CARE EDUCATION/TRAINING PROGRAM

## 2021-12-21 PROCEDURE — 96372 THER/PROPH/DIAG INJ SC/IM: CPT

## 2021-12-21 PROCEDURE — 99285 EMERGENCY DEPT VISIT HI MDM: CPT

## 2021-12-21 PROCEDURE — 84703 CHORIONIC GONADOTROPIN ASSAY: CPT

## 2021-12-21 PROCEDURE — 700111 HCHG RX REV CODE 636 W/ 250 OVERRIDE (IP): Performed by: STUDENT IN AN ORGANIZED HEALTH CARE EDUCATION/TRAINING PROGRAM

## 2021-12-21 PROCEDURE — A9270 NON-COVERED ITEM OR SERVICE: HCPCS | Performed by: STUDENT IN AN ORGANIZED HEALTH CARE EDUCATION/TRAINING PROGRAM

## 2021-12-21 PROCEDURE — 700102 HCHG RX REV CODE 250 W/ 637 OVERRIDE(OP): Performed by: STUDENT IN AN ORGANIZED HEALTH CARE EDUCATION/TRAINING PROGRAM

## 2021-12-21 RX ORDER — PREDNISONE 10 MG/1
50 TABLET ORAL DAILY
Qty: 20 TABLET | Refills: 0 | Status: SHIPPED | OUTPATIENT
Start: 2021-12-21 | End: 2021-12-25

## 2021-12-21 RX ORDER — LIDOCAINE 50 MG/G
1 PATCH TOPICAL EVERY 24 HOURS
Qty: 10 PATCH | Refills: 0 | Status: SHIPPED | OUTPATIENT
Start: 2021-12-21

## 2021-12-21 RX ORDER — METHOCARBAMOL 750 MG/1
1500 TABLET, FILM COATED ORAL 3 TIMES DAILY
Qty: 20 TABLET | Refills: 0 | Status: SHIPPED | OUTPATIENT
Start: 2021-12-21

## 2021-12-21 RX ORDER — OXYCODONE HYDROCHLORIDE AND ACETAMINOPHEN 5; 325 MG/1; MG/1
1 TABLET ORAL ONCE
Status: COMPLETED | OUTPATIENT
Start: 2021-12-21 | End: 2021-12-21

## 2021-12-21 RX ORDER — KETOROLAC TROMETHAMINE 30 MG/ML
15 INJECTION, SOLUTION INTRAMUSCULAR; INTRAVENOUS ONCE
Status: COMPLETED | OUTPATIENT
Start: 2021-12-21 | End: 2021-12-21

## 2021-12-21 RX ORDER — LIDOCAINE 50 MG/G
1 PATCH TOPICAL ONCE
Status: DISCONTINUED | OUTPATIENT
Start: 2021-12-21 | End: 2021-12-22 | Stop reason: HOSPADM

## 2021-12-21 RX ORDER — NAPROXEN 375 MG/1
375 TABLET ORAL 2 TIMES DAILY WITH MEALS
Qty: 20 TABLET | Refills: 0 | Status: SHIPPED | OUTPATIENT
Start: 2021-12-21

## 2021-12-21 RX ORDER — METHOCARBAMOL 500 MG/1
1000 TABLET, FILM COATED ORAL ONCE
Status: COMPLETED | OUTPATIENT
Start: 2021-12-21 | End: 2021-12-21

## 2021-12-21 RX ADMIN — PREDNISONE 50 MG: 20 TABLET ORAL at 22:58

## 2021-12-21 RX ADMIN — METHOCARBAMOL 1000 MG: 500 TABLET ORAL at 22:58

## 2021-12-21 RX ADMIN — OXYCODONE HYDROCHLORIDE AND ACETAMINOPHEN 1 TABLET: 5; 325 TABLET ORAL at 22:08

## 2021-12-21 RX ADMIN — LIDOCAINE 1 PATCH: 50 PATCH TOPICAL at 23:19

## 2021-12-21 RX ADMIN — OXYCODONE HYDROCHLORIDE AND ACETAMINOPHEN 1 TABLET: 5; 325 TABLET ORAL at 21:19

## 2021-12-21 RX ADMIN — KETOROLAC TROMETHAMINE 15 MG: 30 INJECTION, SOLUTION INTRAMUSCULAR at 22:59

## 2021-12-21 ASSESSMENT — PAIN DESCRIPTION - PAIN TYPE
TYPE: ACUTE PAIN
TYPE: ACUTE PAIN

## 2021-12-21 ASSESSMENT — ENCOUNTER SYMPTOMS
EYE DISCHARGE: 0
EYE REDNESS: 0
FEVER: 0
CHILLS: 0
SPEECH CHANGE: 0
LOSS OF CONSCIOUSNESS: 0
BACK PAIN: 1

## 2021-12-22 NOTE — ED NOTES
Discharge education provided. Patient verbalizes understanding. Patient A/Ox4 and escorted to the lobby via wheelchair by her significant other. Patient discharged home to self care.

## 2021-12-22 NOTE — ED PROVIDER NOTES
"ED Provider Note    Chief Complaint:   Low back pain    HPI:  Kaitlynn Gregorio is a very pleasant 31-year-old female who presents with acute onset low back pain 1 hour prior to arrival after bending down to lift up a hatchet. Patient reports the pain is sharp, severe, nonradiating, constant but worse with movement in the lower back without associated numbness or weakness, saddle anesthesia, bowel or bladder incontinence. Patient denies fevers or chills or trauma.    Review of Systems:  Review of Systems   Constitutional: Negative for chills and fever.   Eyes: Negative for discharge and redness.   Musculoskeletal: Positive for back pain.   Neurological: Negative for speech change and loss of consciousness.       Past Medical History:   has a past medical history of GERD (gastroesophageal reflux disease) and Migraine.    Social History:  Social History     Tobacco Use   • Smoking status: Never Smoker   • Smokeless tobacco: Never Used   Vaping Use   • Vaping Use: Never used   Substance and Sexual Activity   • Alcohol use: Yes     Comment: Rare   • Drug use: No   • Sexual activity: Yes     Partners: Male     Comment: , one child       Surgical History:   has a past surgical history that includes dilation and curettage.    Current Medications:  Home Medications     Reviewed by Annie Mcclain R.N. (Registered Nurse) on 12/21/21 at 1922  Med List Status: Not Addressed   Medication Last Dose Status   omeprazole (PRILOSEC) 20 MG delayed-release capsule  Active   RaNITidine HCl (ZANTAC PO)  Active                Allergies:  Allergies   Allergen Reactions   • Amoxicillin    • Penicillins        Physical Exam:  Vital Signs: /66   Pulse 87   Temp 37 °C (98.6 °F) (Oral)   Resp 19   Ht 1.702 m (5' 7\")   Wt 118 kg (260 lb)   SpO2 98%   BMI 40.72 kg/m²   Physical Exam  Constitutional:       Appearance: She is not toxic-appearing.      Comments: Patient appears very uncomfortable, lying flat on her back, alert and " oriented x3   HENT:      Head: Normocephalic and atraumatic.   Pulmonary:      Effort: Pulmonary effort is normal. No respiratory distress.   Musculoskeletal:      Comments: No midline C/T/L-spine tenderness to palpation, no step-offs or deformities, no overlying abrasions or ecchymosis or swelling   Skin:     General: Skin is warm and dry.   Neurological:      Mental Status: She is alert.         Labs:  Labs Reviewed   HCG QUAL SERUM    Narrative:     Collected By:       Radiology:  DX-LUMBAR SPINE-2 OR 3 VIEWS   Final Result      Mild spondylosis L5-S1. No acute fracture or listhesis.           ED Medications Administered:  Medications   lidocaine (LIDODERM) 5 % 1 Patch (1 Patch Transdermal Patch Applied 12/21/21 2319)   oxyCODONE-acetaminophen (PERCOCET) 5-325 MG per tablet 1 Tablet (1 Tablet Oral Given 12/21/21 2119)   oxyCODONE-acetaminophen (PERCOCET) 5-325 MG per tablet 1 Tablet (1 Tablet Oral Given 12/21/21 2208)   methocarbamol (ROBAXIN) tablet 1,000 mg (1,000 mg Oral Given 12/21/21 2258)   ketorolac (TORADOL) injection 15 mg (15 mg Intramuscular Given 12/21/21 2259)   predniSONE (DELTASONE) tablet 50 mg (50 mg Oral Given 12/21/21 2258)       MDM:  No midline tenderness to palpation, no step-offs or deformities, fracture vs dislocation are inconsistent with patient presentation at this time. Patient denies bowel or bladder incontinence, saddle anesthesia. Patient has intact motor in the bilateral lower extremities and sensation to light touch is grossly intact in the distal bilateral lower extremities. Patient without fever, swelling, erythema over the spine. Cauda equina vs epidural abscess vs spinal cord compression are inconsistent with patient presentation at this time. X-ray to evaluate for fracture versus dislocation. Percocet for symptom control. Patient having difficulty urinating because she cannot sit on the toilet, post void residual evaluation pending urination. hCG evaluate for  pregnancy.      Electronically signed by: Gal Shepherd M.D., 12/21/2021, 8:34 PM    X-ray demonstrates mild spondylosis of L5-S1, no fractures or dislocations.  hCG negative.  Postvoid residual less than 5 mL using bedside ultrasound given multiple abnormal readings by bladder scanner.  Patient has been given multiple medications and will be discharged with Naprosyn, Robaxin, steroids.  Patient has no evidence of spinal compression on physical exam or history at this time.  Patient instructed to return the emergency department should she experience worsening pain, numbness, weakness, saddle anesthesia, bladder or bladder incontinence, urinary retention.    Repeat physical exam benign.  I doubt any serious emergency process at this time.  Patient and/or family, friends given strict return precautions and care instructions. They have demonstrated understanding of discharge instructions through teach back mechanism. Advised PCP follow-up in 1-2 days.  Patient/family/friend expresses understanding and agrees to plan.    This dictation has been created using voice recognition software. I am continuously working with the software to minimize the number of voice recognition errors and I have made every attempt to manually correct the errors within my dictation. However errors  related to this voice recognition software may still exist and should be interpreted within the appropriate context.     Electronically signed by: Gal Shepherd M.D., 12/21/2021 11:26 PM      Disposition:  Home    Final Impression:  1. Strain of lumbar region, initial encounter

## 2021-12-22 NOTE — ED TRIAGE NOTES
Chief Complaint   Patient presents with   • Low Back Pain     Patient bent over to  a hatchet to chop wood and had a sudden stabbing pain to the lower back. Patient reports she was unable to stand back up and had to lower herself to the ground. PAtient has no history of back pain. Patient denies numbness or tingling. Pain was not relieved by 800mg motrin.

## 2021-12-22 NOTE — DISCHARGE INSTRUCTIONS
Please turn the emergency department should you experience any weakness, numbness, difficulty urinating or passing stool, worsening pain or fevers or chills

## 2022-06-23 ENCOUNTER — HOSPITAL ENCOUNTER (EMERGENCY)
Facility: MEDICAL CENTER | Age: 32
End: 2022-06-23
Attending: EMERGENCY MEDICINE
Payer: COMMERCIAL

## 2022-06-23 ENCOUNTER — APPOINTMENT (OUTPATIENT)
Dept: RADIOLOGY | Facility: MEDICAL CENTER | Age: 32
End: 2022-06-23
Attending: EMERGENCY MEDICINE
Payer: COMMERCIAL

## 2022-06-23 VITALS
WEIGHT: 266.32 LBS | TEMPERATURE: 97.2 F | OXYGEN SATURATION: 96 % | HEIGHT: 66 IN | BODY MASS INDEX: 42.8 KG/M2 | HEART RATE: 72 BPM | SYSTOLIC BLOOD PRESSURE: 129 MMHG | RESPIRATION RATE: 20 BRPM | DIASTOLIC BLOOD PRESSURE: 71 MMHG

## 2022-06-23 DIAGNOSIS — N93.9 VAGINAL BLEEDING: ICD-10-CM

## 2022-06-23 DIAGNOSIS — O20.0 THREATENED MISCARRIAGE IN EARLY PREGNANCY: ICD-10-CM

## 2022-06-23 LAB
ALBUMIN SERPL BCP-MCNC: 4 G/DL (ref 3.2–4.9)
ALBUMIN/GLOB SERPL: 1.3 G/DL
ALP SERPL-CCNC: 85 U/L (ref 30–99)
ALT SERPL-CCNC: 12 U/L (ref 2–50)
ANION GAP SERPL CALC-SCNC: 12 MMOL/L (ref 7–16)
APPEARANCE UR: CLEAR
AST SERPL-CCNC: 13 U/L (ref 12–45)
B-HCG SERPL-ACNC: ABNORMAL MIU/ML (ref 0–5)
BACTERIA #/AREA URNS HPF: NEGATIVE /HPF
BASOPHILS # BLD AUTO: 0.4 % (ref 0–1.8)
BASOPHILS # BLD: 0.04 K/UL (ref 0–0.12)
BILIRUB SERPL-MCNC: 0.3 MG/DL (ref 0.1–1.5)
BILIRUB UR QL STRIP.AUTO: NEGATIVE
BUN SERPL-MCNC: 7 MG/DL (ref 8–22)
CALCIUM SERPL-MCNC: 8.9 MG/DL (ref 8.5–10.5)
CHLORIDE SERPL-SCNC: 102 MMOL/L (ref 96–112)
CO2 SERPL-SCNC: 21 MMOL/L (ref 20–33)
COLOR UR: YELLOW
CREAT SERPL-MCNC: 0.57 MG/DL (ref 0.5–1.4)
EOSINOPHIL # BLD AUTO: 0.03 K/UL (ref 0–0.51)
EOSINOPHIL NFR BLD: 0.3 % (ref 0–6.9)
EPI CELLS #/AREA URNS HPF: NEGATIVE /HPF
ERYTHROCYTE [DISTWIDTH] IN BLOOD BY AUTOMATED COUNT: 47.3 FL (ref 35.9–50)
GFR SERPLBLD CREATININE-BSD FMLA CKD-EPI: 122 ML/MIN/1.73 M 2
GLOBULIN SER CALC-MCNC: 3 G/DL (ref 1.9–3.5)
GLUCOSE SERPL-MCNC: 101 MG/DL (ref 65–99)
GLUCOSE UR STRIP.AUTO-MCNC: NEGATIVE MG/DL
HCT VFR BLD AUTO: 37.9 % (ref 37–47)
HGB BLD-MCNC: 12.3 G/DL (ref 12–16)
HYALINE CASTS #/AREA URNS LPF: NORMAL /LPF
IMM GRANULOCYTES # BLD AUTO: 0.02 K/UL (ref 0–0.11)
IMM GRANULOCYTES NFR BLD AUTO: 0.2 % (ref 0–0.9)
KETONES UR STRIP.AUTO-MCNC: NEGATIVE MG/DL
LEUKOCYTE ESTERASE UR QL STRIP.AUTO: NEGATIVE
LIPASE SERPL-CCNC: 44 U/L (ref 11–82)
LYMPHOCYTES # BLD AUTO: 3.27 K/UL (ref 1–4.8)
LYMPHOCYTES NFR BLD: 30.8 % (ref 22–41)
MCH RBC QN AUTO: 28.1 PG (ref 27–33)
MCHC RBC AUTO-ENTMCNC: 32.5 G/DL (ref 33.6–35)
MCV RBC AUTO: 86.5 FL (ref 81.4–97.8)
MICRO URNS: ABNORMAL
MONOCYTES # BLD AUTO: 0.49 K/UL (ref 0–0.85)
MONOCYTES NFR BLD AUTO: 4.6 % (ref 0–13.4)
NEUTROPHILS # BLD AUTO: 6.76 K/UL (ref 2–7.15)
NEUTROPHILS NFR BLD: 63.7 % (ref 44–72)
NITRITE UR QL STRIP.AUTO: NEGATIVE
NRBC # BLD AUTO: 0 K/UL
NRBC BLD-RTO: 0 /100 WBC
PH UR STRIP.AUTO: 7.5 [PH] (ref 5–8)
PLATELET # BLD AUTO: 264 K/UL (ref 164–446)
PMV BLD AUTO: 9.6 FL (ref 9–12.9)
POTASSIUM SERPL-SCNC: 3.5 MMOL/L (ref 3.6–5.5)
PROT SERPL-MCNC: 7 G/DL (ref 6–8.2)
PROT UR QL STRIP: NEGATIVE MG/DL
RBC # BLD AUTO: 4.38 M/UL (ref 4.2–5.4)
RBC # URNS HPF: NORMAL /HPF
RBC UR QL AUTO: ABNORMAL
SODIUM SERPL-SCNC: 135 MMOL/L (ref 135–145)
SP GR UR STRIP.AUTO: 1.01
UROBILINOGEN UR STRIP.AUTO-MCNC: 0.2 MG/DL
WBC # BLD AUTO: 10.6 K/UL (ref 4.8–10.8)
WBC #/AREA URNS HPF: NORMAL /HPF

## 2022-06-23 PROCEDURE — 99284 EMERGENCY DEPT VISIT MOD MDM: CPT

## 2022-06-23 PROCEDURE — 83690 ASSAY OF LIPASE: CPT

## 2022-06-23 PROCEDURE — 36415 COLL VENOUS BLD VENIPUNCTURE: CPT

## 2022-06-23 PROCEDURE — 76801 OB US < 14 WKS SINGLE FETUS: CPT

## 2022-06-23 PROCEDURE — 85025 COMPLETE CBC W/AUTO DIFF WBC: CPT

## 2022-06-23 PROCEDURE — 80053 COMPREHEN METABOLIC PANEL: CPT

## 2022-06-23 PROCEDURE — 81001 URINALYSIS AUTO W/SCOPE: CPT

## 2022-06-23 PROCEDURE — 84702 CHORIONIC GONADOTROPIN TEST: CPT

## 2022-06-23 ASSESSMENT — ENCOUNTER SYMPTOMS
COUGH: 0
NAUSEA: 1
BACK PAIN: 0
CHILLS: 0
FEVER: 0
VOMITING: 0
ABDOMINAL PAIN: 1

## 2022-06-23 NOTE — ED NOTES
Pt provided with discharge instructions. Pt verbalized understanding. PIV removed and pt assisted out of ed with steady gait.

## 2022-06-23 NOTE — ED NOTES
Pt ambulatory to [facility], pt provided urine sample on way to room

## 2022-06-23 NOTE — DISCHARGE INSTRUCTIONS
Ultrasound shows a gestational sac, likely early pregnancy.  It is important to follow your beta quantitative hCG.  Today it was 12,922.

## 2022-06-23 NOTE — ED PROVIDER NOTES
ED Provider Note    ED Provider Note    Primary care provider: Kingsley Staton P.A.-C.  Means of arrival: POV  History obtained from: patient, spouse  History limited by: None    CHIEF COMPLAINT  Chief Complaint   Patient presents with   • Vaginal Bleeding   • Pregnancy     6-8 weeks approximately       HPI  Kaitlynn Gregorio is a 31 y.o. female who presents to the Emergency Department with a chief complaint of vaginal bleeding that started about 9:30 AM.  She thought it was her period, then she wiped and saw about a $0.50 piece area of blood on the toilet paper.  She states she passed a few small clots and then the bleeding has subsided.  She experienced some minor cramping but is not having any pain currently.  She has been trying to get pregnant for several years now.  She lives in [facility] and was seeing a doctor indu in [place] for assistance with this.  She received a shot of hCG towards the middle of May and was instructed that she and her  should have intercourse for 3 days running going from May 18 to May 20.  They indeed did do this and she has had multiple positive pregnancy tests including 2 beta quantitative hCGs.  Initially  it was 62 and on  it was 156.  He does have breast swelling, nausea and is feeling pregnant.  Her last menstrual period was May 4.  She is G3, .  She is positive.  She denies any urinary complaints.  No fever cough or cold symptoms.    REVIEW OF SYSTEMS  Review of Systems   Constitutional: Negative for chills and fever.   HENT: Negative for congestion.    Respiratory: Negative for cough.    Cardiovascular: Negative for chest pain.   Gastrointestinal: Positive for abdominal pain and nausea. Negative for vomiting.        Minor cramping   Genitourinary: Negative for dysuria and urgency.   Musculoskeletal: Negative for back pain.       PAST MEDICAL HISTORY   has a past medical history of GERD (gastroesophageal reflux disease) and Migraine.    SURGICAL  "HISTORY   has a past surgical history that includes dilation and curettage.    SOCIAL HISTORY  Social History     Tobacco Use   • Smoking status: Never Smoker   • Smokeless tobacco: Never Used   Vaping Use   • Vaping Use: Never used   Substance Use Topics   • Alcohol use: Yes     Comment: Rare   • Drug use: No      Social History     Substance and Sexual Activity   Drug Use No       FAMILY HISTORY  Family History   Problem Relation Age of Onset   • Heart Disease Father    • Hypertension Father    • Hyperlipidemia Father    • Diabetes Father        CURRENT MEDICATIONS  Home Medications     Reviewed by Mouna Rivas R.N. (Registered Nurse) on 06/23/22 at 1126  Med List Status: Partial   Medication Last Dose Status   lidocaine (LIDODERM) 5 % Patch  Active   methocarbamol (ROBAXIN) 750 MG Tab  Active   naproxen (NAPROSYN) 375 MG Tab  Active   omeprazole (PRILOSEC) 20 MG delayed-release capsule  Active   RaNITidine HCl (ZANTAC PO)  Active                ALLERGIES  Allergies   Allergen Reactions   • Amoxicillin    • Penicillins        PHYSICAL EXAM  VITAL SIGNS: /76   Pulse 93   Temp 36.6 °C (97.9 °F) (Temporal)   Resp 20   Ht 1.676 m (5' 6\")   Wt 121 kg (266 lb 5.1 oz)   SpO2 98%   BMI 42.98 kg/m²   Vitals reviewed.  Constitutional: Patient is oriented to person, place, and time. Appears well-developed and well-nourished. No distress.    Head: Normocephalic and atraumatic.  Mouth/Throat: Oropharynx is clear and moist  Eyes: Conjunctivae are normal. Pupils are equal, and round.  Neck: Normal range of motion.   Cardiovascular: Normal rate, regular rhythm and normal heart sounds.   Pulmonary/Chest: Effort normal and breath sounds normal. No respiratory distress, no wheezes, rhonchi, or rales.  Abdominal: Soft. Bowel sounds are normal. There is no tenderness.   Musculoskeletal: No edema  Neurological: No focal deficits.   Skin: Skin is warm and dry. No erythema. No pallor.   Psychiatric: Patient has a " normal mood and affect.     LABS  Results for orders placed or performed during the hospital encounter of 06/23/22   CBC WITH DIFFERENTIAL   Result Value Ref Range    WBC 10.6 4.8 - 10.8 K/uL    RBC 4.38 4.20 - 5.40 M/uL    Hemoglobin 12.3 12.0 - 16.0 g/dL    Hematocrit 37.9 37.0 - 47.0 %    MCV 86.5 81.4 - 97.8 fL    MCH 28.1 27.0 - 33.0 pg    MCHC 32.5 (L) 33.6 - 35.0 g/dL    RDW 47.3 35.9 - 50.0 fL    Platelet Count 264 164 - 446 K/uL    MPV 9.6 9.0 - 12.9 fL    Neutrophils-Polys 63.70 44.00 - 72.00 %    Lymphocytes 30.80 22.00 - 41.00 %    Monocytes 4.60 0.00 - 13.40 %    Eosinophils 0.30 0.00 - 6.90 %    Basophils 0.40 0.00 - 1.80 %    Immature Granulocytes 0.20 0.00 - 0.90 %    Nucleated RBC 0.00 /100 WBC    Neutrophils (Absolute) 6.76 2.00 - 7.15 K/uL    Lymphs (Absolute) 3.27 1.00 - 4.80 K/uL    Monos (Absolute) 0.49 0.00 - 0.85 K/uL    Eos (Absolute) 0.03 0.00 - 0.51 K/uL    Baso (Absolute) 0.04 0.00 - 0.12 K/uL    Immature Granulocytes (abs) 0.02 0.00 - 0.11 K/uL    NRBC (Absolute) 0.00 K/uL   COMP METABOLIC PANEL   Result Value Ref Range    Sodium 135 135 - 145 mmol/L    Potassium 3.5 (L) 3.6 - 5.5 mmol/L    Chloride 102 96 - 112 mmol/L    Co2 21 20 - 33 mmol/L    Anion Gap 12.0 7.0 - 16.0    Glucose 101 (H) 65 - 99 mg/dL    Bun 7 (L) 8 - 22 mg/dL    Creatinine 0.57 0.50 - 1.40 mg/dL    Calcium 8.9 8.5 - 10.5 mg/dL    AST(SGOT) 13 12 - 45 U/L    ALT(SGPT) 12 2 - 50 U/L    Alkaline Phosphatase 85 30 - 99 U/L    Total Bilirubin 0.3 0.1 - 1.5 mg/dL    Albumin 4.0 3.2 - 4.9 g/dL    Total Protein 7.0 6.0 - 8.2 g/dL    Globulin 3.0 1.9 - 3.5 g/dL    A-G Ratio 1.3 g/dL   LIPASE   Result Value Ref Range    Lipase 44 11 - 82 U/L   HCG QUANTITATIVE   Result Value Ref Range    Bhcg 73258.0 (H) 0.0 - 5.0 mIU/mL   URINALYSIS,CULTURE IF INDICATED    Specimen: Urine   Result Value Ref Range    Color Yellow     Character Clear     Specific Gravity 1.008 <1.035    Ph 7.5 5.0 - 8.0    Glucose Negative Negative mg/dL     Ketones Negative Negative mg/dL    Protein Negative Negative mg/dL    Bilirubin Negative Negative    Urobilinogen, Urine 0.2 Negative    Nitrite Negative Negative    Leukocyte Esterase Negative Negative    Occult Blood Trace (A) Negative    Micro Urine Req Microscopic    URINE MICROSCOPIC (W/UA)   Result Value Ref Range    WBC 0-2 /hpf    RBC 0-2 /hpf    Bacteria Negative None /hpf    Epithelial Cells Negative /hpf    Hyaline Cast 0-2 /lpf   ESTIMATED GFR   Result Value Ref Range    GFR (CKD-EPI) 122 >60 mL/min/1.73 m 2       All labs reviewed by me.    RADIOLOGY  US-OB 1ST TRIMESTER WITH TRANSVAGINAL (COMBO)   Final Result      1.  Gestational sac without fetus seen, which could reflect early pregnancy.   2.  Gestational sac consistent with 6 weeks, 1 days estimated gestational age.        The radiologist's interpretation of all radiological studies have been reviewed by me.    COURSE & MEDICAL DECISION MAKING  Pertinent Labs & Imaging studies reviewed. (See chart for details)    Obtained and reviewed past medical records.  Patient was seen in the emergency department most recently December 21 of last year for acute low back pain.  Diagnosed with a lumbar strain.    3:23 PM - Patient seen and examined at bedside.  This is a pleasant 31-year-old female.  She has had positive pregnancy TC test at home.  She believes she conceived somewhere between May 18 to 20.  She began having some vaginal bleeding this morning, it has since subsided and she has had no abdominal pain.  Labs and imaging have been ordered per nursing protocols.      Patient's reevaluated the bedside.  No further vaginal bleeding.  She is not having pain.  We discussed lab results including a beta quantitative hCG nearing 13,000.  Ultrasound shows a gestational sac but no fetus though when I suspect, the patient likely conceived, this is in keeping with her dates.  She has made an appointment with Dr. White at [facility] and is scheduled to be  seen there on the eighth.  I have advised her to call and be seen earlier and have her beta quantitative hCG repeated.  He understands this plan of care.  She is advised on no heavy lifting and avoiding intercourse.  She is well-appearing and nontoxic.  She is discharged home in stable condition.      FINAL IMPRESSION  1. Vaginal bleeding    2. Threatened miscarriage in early pregnancy

## 2022-06-23 NOTE — ED TRIAGE NOTES
Vitals:    06/23/22 1119   BP: (!) 150/88   Pulse: 89   Resp: 16   Temp: 36.6 °C (97.9 °F)   SpO2: 96%     Chief Complaint   Patient presents with   • Vaginal Bleeding   • Pregnancy     6-8 weeks approximately     Pt started bleeding this morning that she noticed when she wiped. She is wearing a pad and has not noticed any more blood. She is set up with an OBGYN but has not yet had an appointment.     Pt is ambulatory to and from triage and is alert and oriented x 4.